# Patient Record
Sex: FEMALE | URBAN - METROPOLITAN AREA
[De-identification: names, ages, dates, MRNs, and addresses within clinical notes are randomized per-mention and may not be internally consistent; named-entity substitution may affect disease eponyms.]

---

## 2020-03-16 ENCOUNTER — APPOINTMENT (RX ONLY)
Dept: URBAN - METROPOLITAN AREA OTHER 13 | Facility: OTHER | Age: 40
Setting detail: DERMATOLOGY
End: 2020-03-16

## 2020-03-16 DIAGNOSIS — L71.0 PERIORAL DERMATITIS: ICD-10-CM | Status: INADEQUATELY CONTROLLED

## 2020-03-16 PROCEDURE — ? PRESCRIPTION

## 2020-03-16 PROCEDURE — ? COUNSELING

## 2020-03-16 PROCEDURE — ? TREATMENT REGIMEN

## 2020-03-16 PROCEDURE — 99202 OFFICE O/P NEW SF 15 MIN: CPT

## 2020-03-16 RX ORDER — IVERMECTIN 10 MG/G
CREAM TOPICAL
Qty: 1 | Refills: 3 | Status: ERX | COMMUNITY
Start: 2020-03-16

## 2020-03-16 RX ADMIN — IVERMECTIN: 10 CREAM TOPICAL at 00:00

## 2020-03-16 ASSESSMENT — LOCATION DETAILED DESCRIPTION DERM
LOCATION DETAILED: RIGHT CENTRAL MALAR CHEEK
LOCATION DETAILED: LEFT INFERIOR CENTRAL MALAR CHEEK

## 2020-03-16 ASSESSMENT — LOCATION ZONE DERM: LOCATION ZONE: FACE

## 2020-03-16 ASSESSMENT — LOCATION SIMPLE DESCRIPTION DERM
LOCATION SIMPLE: RIGHT CHEEK
LOCATION SIMPLE: LEFT CHEEK

## 2020-03-16 NOTE — PROCEDURE: TREATMENT REGIMEN
Continue Regimen: Okay to continue mometasone (steroid at home) once a day X3 days for flares
Detail Level: Simple
Samples Given: Soolantra \\nRecommended cerave hydrating cleanser, or cetaphil redness cleanser, cerave moisturizing lotion \\nRecommend sunscreen (cerave AM with spf)
Plan: Reassess in 4 months. Make sure to try samples first to be assured that medication can be tolerated. Discussed triggers that cause rosacea and perioral to flare.
Initiate Treatment: Targadox (doxycycline 50mg) daily as tolerated for X1 month, recommended to take with food, okay to take twice a day when flared, does not need to be taken continuously, if discontinuing be assured to taper down to one day on one day off for X1-2 weeks \\nSoolantra, distribute pea sized amount to face daily either bedtime or morning, okay to use twice a day when flared, okay to use with moisturizers and makeup, waiting 15 minutes after application to apply other things onto the face